# Patient Record
Sex: FEMALE | Race: WHITE | NOT HISPANIC OR LATINO | URBAN - METROPOLITAN AREA
[De-identification: names, ages, dates, MRNs, and addresses within clinical notes are randomized per-mention and may not be internally consistent; named-entity substitution may affect disease eponyms.]

---

## 2023-10-05 PROBLEM — F41.9 ANXIETY DISORDER: Status: ACTIVE | Noted: 2023-10-05

## 2023-10-05 PROBLEM — R63.5 WEIGHT GAIN: Status: ACTIVE | Noted: 2023-10-05

## 2023-10-05 PROBLEM — R10.13 EPIGASTRIC PAIN: Status: ACTIVE | Noted: 2023-10-05

## 2023-10-05 PROBLEM — D18.01 HEMANGIOMA OF SKIN AND SUBCUTANEOUS TISSUE: Status: ACTIVE | Noted: 2021-12-15

## 2023-10-05 PROBLEM — R53.83 FATIGUE: Status: ACTIVE | Noted: 2023-10-05

## 2023-10-05 PROBLEM — L81.4 OTHER MELANIN HYPERPIGMENTATION: Status: ACTIVE | Noted: 2021-12-15

## 2023-10-05 PROBLEM — K21.9 ACID REFLUX: Status: ACTIVE | Noted: 2023-10-05

## 2023-10-05 PROBLEM — N63.11 LUMP IN UPPER OUTER QUADRANT OF RIGHT BREAST: Status: ACTIVE | Noted: 2023-10-05

## 2023-10-05 PROBLEM — N89.8 VAGINAL DISCHARGE: Status: ACTIVE | Noted: 2023-10-05

## 2023-10-05 PROBLEM — L90.5 SCAR CONDITION AND FIBROSIS OF SKIN: Status: ACTIVE | Noted: 2021-12-15

## 2023-10-05 PROBLEM — N94.89 VAGINAL BURNING: Status: ACTIVE | Noted: 2023-10-05

## 2023-10-05 PROBLEM — R19.5 LOOSE STOOLS: Status: ACTIVE | Noted: 2023-10-05

## 2023-10-05 PROBLEM — D22.5 MELANOCYTIC NEVI OF TRUNK: Status: ACTIVE | Noted: 2021-12-15

## 2023-10-05 PROBLEM — N94.9 VAGINAL BURNING: Status: ACTIVE | Noted: 2023-10-05

## 2023-10-05 PROBLEM — N89.8 VAGINAL ITCHING: Status: ACTIVE | Noted: 2023-10-05

## 2023-10-05 PROBLEM — L57.8 OTHER SKIN CHANGES DUE TO CHRONIC EXPOSURE TO NONIONIZING RADIATION: Status: ACTIVE | Noted: 2021-12-15

## 2023-10-05 RX ORDER — ASPIRIN 325 MG
1 TABLET ORAL DAILY
COMMUNITY

## 2023-10-05 RX ORDER — OMEPRAZOLE 20 MG/1
1 TABLET, DELAYED RELEASE ORAL DAILY
COMMUNITY
End: 2023-10-09 | Stop reason: ALTCHOICE

## 2023-10-09 ENCOUNTER — OFFICE VISIT (OUTPATIENT)
Dept: PRIMARY CARE | Facility: CLINIC | Age: 38
End: 2023-10-09
Payer: COMMERCIAL

## 2023-10-09 VITALS
HEIGHT: 63 IN | TEMPERATURE: 97.9 F | BODY MASS INDEX: 22.86 KG/M2 | SYSTOLIC BLOOD PRESSURE: 117 MMHG | HEART RATE: 62 BPM | WEIGHT: 129 LBS | DIASTOLIC BLOOD PRESSURE: 82 MMHG

## 2023-10-09 DIAGNOSIS — Z00.00 HEALTH MAINTENANCE EXAMINATION: Primary | ICD-10-CM

## 2023-10-09 DIAGNOSIS — R53.83 FATIGUE, UNSPECIFIED TYPE: ICD-10-CM

## 2023-10-09 PROCEDURE — 99395 PREV VISIT EST AGE 18-39: CPT | Performed by: FAMILY MEDICINE

## 2023-10-09 PROCEDURE — 1036F TOBACCO NON-USER: CPT | Performed by: FAMILY MEDICINE

## 2023-10-09 PROCEDURE — 90686 IIV4 VACC NO PRSV 0.5 ML IM: CPT | Performed by: FAMILY MEDICINE

## 2023-10-09 PROCEDURE — 90471 IMMUNIZATION ADMIN: CPT | Performed by: FAMILY MEDICINE

## 2023-10-09 ASSESSMENT — PATIENT HEALTH QUESTIONNAIRE - PHQ9
1. LITTLE INTEREST OR PLEASURE IN DOING THINGS: NOT AT ALL
SUM OF ALL RESPONSES TO PHQ9 QUESTIONS 1 AND 2: 0
2. FEELING DOWN, DEPRESSED OR HOPELESS: NOT AT ALL

## 2023-10-09 NOTE — PROGRESS NOTES
"Subjective   Patient ID: Monika Madrigal is a 38 y.o. female who presents for Annual Exam.  Ends today for an annual wellness visit.  Reports that she is feeling well.  States has been eating healthy and exercising.  Reports mood has been good.  She denies any chest pain or shortness of breath or abdominal pain.  Reports periods are regular.  She denies any other concerns.  HPI  Review of Systems    Review of Systems negative except as noted in HPI and Chief complaint.     Objective               /82 (BP Location: Right arm, Patient Position: Sitting)   Pulse 62   Temp 36.6 °C (97.9 °F)   Ht 1.6 m (5' 3\")   Wt 58.5 kg (129 lb)   BMI 22.85 kg/m²    Physical Exam  Vitals reviewed.   HENT:      Head: Normocephalic and atraumatic.      Right Ear: Tympanic membrane normal.      Left Ear: Tympanic membrane normal.      Nose: Nose normal.   Eyes:      Extraocular Movements: Extraocular movements intact.      Conjunctiva/sclera: Conjunctivae normal.      Pupils: Pupils are equal, round, and reactive to light.   Cardiovascular:      Rate and Rhythm: Normal rate and regular rhythm.      Pulses: Normal pulses.   Pulmonary:      Effort: Pulmonary effort is normal.      Breath sounds: Normal breath sounds.   Chest:   Breasts:     Right: Normal. No mass, nipple discharge, skin change or tenderness.      Left: Normal. No mass, nipple discharge, skin change or tenderness.   Abdominal:      General: There is no distension.      Palpations: Abdomen is soft.      Tenderness: There is no abdominal tenderness.   Genitourinary:     General: Normal vulva.      Vagina: Normal.      Cervix: Normal.      Uterus: Normal.       Adnexa: Right adnexa normal and left adnexa normal.   Neurological:      General: No focal deficit present.      Mental Status: She is alert.             Assessment/Plan   Problem List Items Addressed This Visit       Fatigue    Relevant Orders    CBC and Auto Differential    Comprehensive Metabolic " Panel    Tsh With Reflex To Free T4 If Abnormal    Health maintenance examination - Primary    Relevant Orders    Lipid Panel

## 2023-10-11 ENCOUNTER — LAB (OUTPATIENT)
Dept: LAB | Facility: LAB | Age: 38
End: 2023-10-11
Payer: COMMERCIAL

## 2023-10-11 DIAGNOSIS — Z00.00 HEALTH MAINTENANCE EXAMINATION: ICD-10-CM

## 2023-10-11 DIAGNOSIS — R53.83 FATIGUE, UNSPECIFIED TYPE: ICD-10-CM

## 2023-10-11 LAB
ALBUMIN SERPL BCP-MCNC: 4.4 G/DL (ref 3.4–5)
ALP SERPL-CCNC: 30 U/L (ref 33–110)
ALT SERPL W P-5'-P-CCNC: 16 U/L (ref 7–45)
ANION GAP SERPL CALC-SCNC: 14 MMOL/L (ref 10–20)
AST SERPL W P-5'-P-CCNC: 20 U/L (ref 9–39)
BASOPHILS # BLD AUTO: 0.06 X10*3/UL (ref 0–0.1)
BASOPHILS NFR BLD AUTO: 1.1 %
BILIRUB SERPL-MCNC: 0.9 MG/DL (ref 0–1.2)
BUN SERPL-MCNC: 10 MG/DL (ref 6–23)
CALCIUM SERPL-MCNC: 9.7 MG/DL (ref 8.6–10.3)
CHLORIDE SERPL-SCNC: 103 MMOL/L (ref 98–107)
CHOLEST SERPL-MCNC: 200 MG/DL (ref 0–199)
CHOLESTEROL/HDL RATIO: 2.1
CO2 SERPL-SCNC: 26 MMOL/L (ref 21–32)
CREAT SERPL-MCNC: 0.8 MG/DL (ref 0.5–1.05)
EOSINOPHIL # BLD AUTO: 0.2 X10*3/UL (ref 0–0.7)
EOSINOPHIL NFR BLD AUTO: 3.8 %
ERYTHROCYTE [DISTWIDTH] IN BLOOD BY AUTOMATED COUNT: 11.8 % (ref 11.5–14.5)
GFR SERPL CREATININE-BSD FRML MDRD: >90 ML/MIN/1.73M*2
GLUCOSE SERPL-MCNC: 85 MG/DL (ref 74–99)
HCT VFR BLD AUTO: 41 % (ref 36–46)
HDLC SERPL-MCNC: 96.5 MG/DL
HGB BLD-MCNC: 13.5 G/DL (ref 12–16)
IMM GRANULOCYTES # BLD AUTO: 0.01 X10*3/UL (ref 0–0.7)
IMM GRANULOCYTES NFR BLD AUTO: 0.2 % (ref 0–0.9)
LDLC SERPL CALC-MCNC: 92 MG/DL (ref 130–180)
LYMPHOCYTES # BLD AUTO: 1.63 X10*3/UL (ref 1.2–4.8)
LYMPHOCYTES NFR BLD AUTO: 31.2 %
MCH RBC QN AUTO: 31.8 PG (ref 26–34)
MCHC RBC AUTO-ENTMCNC: 32.9 G/DL (ref 32–36)
MCV RBC AUTO: 97 FL (ref 80–100)
MONOCYTES # BLD AUTO: 0.68 X10*3/UL (ref 0.1–1)
MONOCYTES NFR BLD AUTO: 13 %
NEUTROPHILS # BLD AUTO: 2.65 X10*3/UL (ref 1.2–7.7)
NEUTROPHILS NFR BLD AUTO: 50.7 %
NON HDL CHOLESTEROL: 104 MG/DL (ref 0–149)
NRBC BLD-RTO: 0 /100 WBCS (ref 0–0)
PLATELET # BLD AUTO: 210 X10*3/UL (ref 150–450)
PMV BLD AUTO: 12.4 FL (ref 7.5–11.5)
POTASSIUM SERPL-SCNC: 4 MMOL/L (ref 3.5–5.3)
PROT SERPL-MCNC: 7.3 G/DL (ref 6.4–8.2)
RBC # BLD AUTO: 4.25 X10*6/UL (ref 4–5.2)
SODIUM SERPL-SCNC: 139 MMOL/L (ref 136–145)
TRIGL SERPL-MCNC: 56 MG/DL (ref 0–149)
TSH SERPL-ACNC: 3.13 MIU/L (ref 0.44–3.98)
VLDL: 11 MG/DL (ref 0–40)
WBC # BLD AUTO: 5.2 X10*3/UL (ref 4.4–11.3)

## 2023-10-11 PROCEDURE — 36415 COLL VENOUS BLD VENIPUNCTURE: CPT

## 2023-10-11 PROCEDURE — 80053 COMPREHEN METABOLIC PANEL: CPT

## 2023-10-11 PROCEDURE — 84443 ASSAY THYROID STIM HORMONE: CPT

## 2023-10-11 PROCEDURE — 80061 LIPID PANEL: CPT

## 2023-10-11 PROCEDURE — 85025 COMPLETE CBC W/AUTO DIFF WBC: CPT

## 2024-09-13 PROBLEM — J45.990 EXERCISE-INDUCED ASTHMA (HHS-HCC): Status: ACTIVE | Noted: 2019-06-22

## 2024-09-19 ENCOUNTER — APPOINTMENT (OUTPATIENT)
Dept: PRIMARY CARE | Facility: CLINIC | Age: 39
End: 2024-09-19
Payer: COMMERCIAL

## 2024-09-19 VITALS
DIASTOLIC BLOOD PRESSURE: 63 MMHG | HEIGHT: 63 IN | BODY MASS INDEX: 22.86 KG/M2 | WEIGHT: 129 LBS | HEART RATE: 75 BPM | SYSTOLIC BLOOD PRESSURE: 97 MMHG

## 2024-09-19 DIAGNOSIS — Z12.4 PAP SMEAR FOR CERVICAL CANCER SCREENING: ICD-10-CM

## 2024-09-19 DIAGNOSIS — R53.83 FATIGUE, UNSPECIFIED TYPE: ICD-10-CM

## 2024-09-19 DIAGNOSIS — J30.9 ALLERGIC RHINITIS, UNSPECIFIED SEASONALITY, UNSPECIFIED TRIGGER: ICD-10-CM

## 2024-09-19 DIAGNOSIS — J45.990 EXERCISE-INDUCED ASTHMA (HHS-HCC): ICD-10-CM

## 2024-09-19 DIAGNOSIS — Z00.00 HEALTHCARE MAINTENANCE: Primary | ICD-10-CM

## 2024-09-19 PROBLEM — N63.11 LUMP IN UPPER OUTER QUADRANT OF RIGHT BREAST: Status: RESOLVED | Noted: 2023-10-05 | Resolved: 2024-09-19

## 2024-09-19 PROBLEM — N94.9 VAGINAL BURNING: Status: RESOLVED | Noted: 2023-10-05 | Resolved: 2024-09-19

## 2024-09-19 PROBLEM — N94.89 VAGINAL BURNING: Status: RESOLVED | Noted: 2023-10-05 | Resolved: 2024-09-19

## 2024-09-19 PROBLEM — N89.8 VAGINAL DISCHARGE: Status: RESOLVED | Noted: 2023-10-05 | Resolved: 2024-09-19

## 2024-09-19 PROBLEM — R19.5 LOOSE STOOLS: Status: RESOLVED | Noted: 2023-10-05 | Resolved: 2024-09-19

## 2024-09-19 PROBLEM — N89.8 VAGINAL ITCHING: Status: RESOLVED | Noted: 2023-10-05 | Resolved: 2024-09-19

## 2024-09-19 PROCEDURE — 3008F BODY MASS INDEX DOCD: CPT | Performed by: FAMILY MEDICINE

## 2024-09-19 PROCEDURE — 1036F TOBACCO NON-USER: CPT | Performed by: FAMILY MEDICINE

## 2024-09-19 PROCEDURE — 88175 CYTOPATH C/V AUTO FLUID REDO: CPT

## 2024-09-19 PROCEDURE — 90471 IMMUNIZATION ADMIN: CPT | Performed by: FAMILY MEDICINE

## 2024-09-19 PROCEDURE — 90656 IIV3 VACC NO PRSV 0.5 ML IM: CPT | Performed by: FAMILY MEDICINE

## 2024-09-19 PROCEDURE — 99395 PREV VISIT EST AGE 18-39: CPT | Performed by: FAMILY MEDICINE

## 2024-09-19 NOTE — PROGRESS NOTES
Subjective   Patient ID: Monika Madrigal is a 39 y.o. female who presents for Annual Exam (Pap exam as well.).  Presents today for a physical.  She reports that overall she has been doing well.  She has been eating healthy and exercising.  Reports mood has been good.  She denies any chest pain or shortness of breath or abdominal pain.  Reports that she had a history of asthma as a teenager that was exercise-induced but has not had any recent issues.  She has had some issues with allergies.  They have a new dog and she is concerned that she may be allergic to the dog.  She like to have allergy testing done.  She does take Claritin.  She reports periods are regular.  She denies any other concerns.  HPI  Social History     Socioeconomic History    Marital status:      Spouse name: Not on file    Number of children: Not on file    Years of education: Not on file    Highest education level: Not on file   Occupational History    Not on file   Tobacco Use    Smoking status: Never    Smokeless tobacco: Never   Substance and Sexual Activity    Alcohol use: Yes     Comment: social    Drug use: Never    Sexual activity: Not on file   Other Topics Concern    Not on file   Social History Narrative    Not on file     Social Determinants of Health     Financial Resource Strain: Not on file   Food Insecurity: Not on file   Transportation Needs: Not on file   Physical Activity: Not on file   Stress: Not on file   Social Connections: Not on file   Intimate Partner Violence: Not on file   Housing Stability: Not on file     Current Outpatient Medications   Medication Sig Dispense Refill    multivitamin (One Daily Multivitamin) tablet Take 1 tablet by mouth once daily.       No current facility-administered medications for this visit.     Family History   Problem Relation Name Age of Onset    Heart disease Mother      Hyperlipidemia Mother      Hypertension Mother      Hypothyroidism Mother      Breast cancer Other G/M      "Other (CANCER OF GASTROINTESTIONAL TRACT) Other G/M     Colon cancer Other G/F     Other (CANCER OF GASROINTESTIONAL TRACT) Other G/F      Review of Systems  Immunization History   Administered Date(s) Administered    Flu vaccine (IIV4), preservative free *Check age/dose* 10/06/2020, 10/09/2023    Flu vaccine, trivalent, preservative free, age 6 months and greater (Fluarix/Fluzone/Flulaval) 09/19/2024    Influenza, Unspecified 11/02/2021, 09/22/2022    Influenza, injectable, quadrivalent 11/13/2018    Pfizer Purple Cap SARS-CoV-2 03/10/2021, 03/31/2021, 12/08/2021    Rho(D)-IG 04/02/2019, 06/22/2019    Tdap vaccine, age 7 year and older (BOOSTRIX, ADACEL) 05/14/2019       Review of Systems negative except as noted in HPI and Chief complaint.     Objective                 BP 97/63 (BP Location: Left arm, Patient Position: Sitting)   Pulse 75   Ht 1.6 m (5' 3\")   Wt 58.5 kg (129 lb)   BMI 22.85 kg/m²    Physical Exam  Vitals reviewed.   HENT:      Head: Normocephalic and atraumatic.      Right Ear: Tympanic membrane normal.      Left Ear: Tympanic membrane normal.      Nose: Nose normal.      Mouth/Throat:      Pharynx: Oropharynx is clear.   Eyes:      Extraocular Movements: Extraocular movements intact.      Conjunctiva/sclera: Conjunctivae normal.      Pupils: Pupils are equal, round, and reactive to light.   Cardiovascular:      Rate and Rhythm: Normal rate and regular rhythm.      Pulses: Normal pulses.   Pulmonary:      Effort: Pulmonary effort is normal.      Breath sounds: Normal breath sounds.   Chest:   Breasts:     Right: Normal. No mass, nipple discharge, skin change or tenderness.      Left: Normal. No mass, nipple discharge, skin change or tenderness.   Abdominal:      General: There is no distension.      Palpations: Abdomen is soft.      Tenderness: There is no abdominal tenderness.   Genitourinary:     General: Normal vulva.      Vagina: Normal.      Cervix: Normal.      Uterus: Normal.       " "Adnexa: Right adnexa normal and left adnexa normal.   Neurological:      General: No focal deficit present.      Mental Status: She is alert.       No results found for this or any previous visit (from the past 96 hour(s)).  Lab Results   Component Value Date    WBC 5.2 10/11/2023    HGB 13.5 10/11/2023    HCT 41.0 10/11/2023    MCV 97 10/11/2023     10/11/2023     Lab Results   Component Value Date    GLUCOSE 85 10/11/2023    CALCIUM 9.7 10/11/2023     10/11/2023    K 4.0 10/11/2023    CO2 26 10/11/2023     10/11/2023    BUN 10 10/11/2023    CREATININE 0.80 10/11/2023     Lab Results   Component Value Date    CHOL 200 (H) 10/11/2023    CHOL 186 10/18/2022    CHOL 196 11/11/2021     Lab Results   Component Value Date    HDL 96.5 10/11/2023    HDL 96.0 10/18/2022    HDL 74.0 11/11/2021     Lab Results   Component Value Date    LDLCALC 92 (L) 10/11/2023     Lab Results   Component Value Date    TRIG 56 10/11/2023    TRIG 44 10/18/2022    TRIG 61 11/11/2021     No components found for: \"CHOLHDL\"   Lab Results   Component Value Date    TSH 3.13 10/11/2023      Lab Results   Component Value Date    HGBA1C 4.4 10/10/2019      No results found for: \"ALBUMINUR\"   Assessment/Plan   Problem List Items Addressed This Visit       Fatigue     Check blood work as ordered including thyroid studies due to family history.         Relevant Orders    Comprehensive Metabolic Panel    CBC and Auto Differential    TSH with reflex to Free T4 if abnormal    Healthcare maintenance - Primary     Continue with healthy diet and exercise.  Screening blood work ordered.  Pap smear done today.  Flu shot given today.  Recommend COVID booster.         Relevant Orders    Lipid Panel    Exercise-induced asthma (HHS-HCC)     Patient has not had symptoms in years.  Will continue to monitor.         Allergic rhinitis     Patient can continue with over-the-counter antihistamines and also add a steroid nasal spray.  Will also refer her " to allergist for possible testing         Relevant Orders    Referral to Allergy     Other Visit Diagnoses       Pap smear for cervical cancer screening        Relevant Orders    THINPREP PAP TEST

## 2024-09-19 NOTE — ASSESSMENT & PLAN NOTE
Patient can continue with over-the-counter antihistamines and also add a steroid nasal spray.  Will also refer her to allergist for possible testing

## 2024-09-19 NOTE — ASSESSMENT & PLAN NOTE
Continue with healthy diet and exercise.  Screening blood work ordered.  Pap smear done today.  Flu shot given today.  Recommend COVID booster.

## 2025-01-10 ENCOUNTER — LAB (OUTPATIENT)
Dept: LAB | Facility: LAB | Age: 40
End: 2025-01-10
Payer: COMMERCIAL

## 2025-01-10 DIAGNOSIS — Z00.00 HEALTHCARE MAINTENANCE: ICD-10-CM

## 2025-01-10 DIAGNOSIS — R53.83 FATIGUE, UNSPECIFIED TYPE: ICD-10-CM

## 2025-01-10 LAB
ALBUMIN SERPL BCP-MCNC: 4.6 G/DL (ref 3.4–5)
ALP SERPL-CCNC: 33 U/L (ref 33–110)
ALT SERPL W P-5'-P-CCNC: 21 U/L (ref 7–45)
ANION GAP SERPL CALC-SCNC: 10 MMOL/L (ref 10–20)
AST SERPL W P-5'-P-CCNC: 16 U/L (ref 9–39)
BASOPHILS # BLD AUTO: 0.05 X10*3/UL (ref 0–0.1)
BASOPHILS NFR BLD AUTO: 1 %
BILIRUB SERPL-MCNC: 0.8 MG/DL (ref 0–1.2)
BUN SERPL-MCNC: 8 MG/DL (ref 6–23)
CALCIUM SERPL-MCNC: 9.5 MG/DL (ref 8.6–10.3)
CHLORIDE SERPL-SCNC: 103 MMOL/L (ref 98–107)
CHOLEST SERPL-MCNC: 219 MG/DL (ref 0–199)
CHOLESTEROL/HDL RATIO: 2.4
CO2 SERPL-SCNC: 29 MMOL/L (ref 21–32)
CREAT SERPL-MCNC: 0.65 MG/DL (ref 0.5–1.05)
EGFRCR SERPLBLD CKD-EPI 2021: >90 ML/MIN/1.73M*2
EOSINOPHIL # BLD AUTO: 0.15 X10*3/UL (ref 0–0.7)
EOSINOPHIL NFR BLD AUTO: 3.1 %
ERYTHROCYTE [DISTWIDTH] IN BLOOD BY AUTOMATED COUNT: 11.8 % (ref 11.5–14.5)
GLUCOSE SERPL-MCNC: 89 MG/DL (ref 74–99)
HCT VFR BLD AUTO: 39.9 % (ref 36–46)
HDLC SERPL-MCNC: 89.6 MG/DL
HGB BLD-MCNC: 13.1 G/DL (ref 12–16)
IMM GRANULOCYTES # BLD AUTO: 0.02 X10*3/UL (ref 0–0.7)
IMM GRANULOCYTES NFR BLD AUTO: 0.4 % (ref 0–0.9)
LDLC SERPL CALC-MCNC: 122 MG/DL
LYMPHOCYTES # BLD AUTO: 1.32 X10*3/UL (ref 1.2–4.8)
LYMPHOCYTES NFR BLD AUTO: 27.5 %
MCH RBC QN AUTO: 31.1 PG (ref 26–34)
MCHC RBC AUTO-ENTMCNC: 32.8 G/DL (ref 32–36)
MCV RBC AUTO: 95 FL (ref 80–100)
MONOCYTES # BLD AUTO: 0.67 X10*3/UL (ref 0.1–1)
MONOCYTES NFR BLD AUTO: 14 %
NEUTROPHILS # BLD AUTO: 2.59 X10*3/UL (ref 1.2–7.7)
NEUTROPHILS NFR BLD AUTO: 54 %
NON HDL CHOLESTEROL: 129 MG/DL (ref 0–149)
NRBC BLD-RTO: 0 /100 WBCS (ref 0–0)
PLATELET # BLD AUTO: 197 X10*3/UL (ref 150–450)
POTASSIUM SERPL-SCNC: 4.2 MMOL/L (ref 3.5–5.3)
PROT SERPL-MCNC: 6.9 G/DL (ref 6.4–8.2)
RBC # BLD AUTO: 4.21 X10*6/UL (ref 4–5.2)
SODIUM SERPL-SCNC: 138 MMOL/L (ref 136–145)
TRIGL SERPL-MCNC: 39 MG/DL (ref 0–149)
TSH SERPL-ACNC: 1.64 MIU/L (ref 0.44–3.98)
VLDL: 8 MG/DL (ref 0–40)
WBC # BLD AUTO: 4.8 X10*3/UL (ref 4.4–11.3)

## 2025-01-10 PROCEDURE — 80053 COMPREHEN METABOLIC PANEL: CPT

## 2025-01-10 PROCEDURE — 80061 LIPID PANEL: CPT

## 2025-01-10 PROCEDURE — 84443 ASSAY THYROID STIM HORMONE: CPT

## 2025-01-10 PROCEDURE — 85025 COMPLETE CBC W/AUTO DIFF WBC: CPT

## 2025-03-06 ASSESSMENT — DERMATOLOGY QUALITY OF LIFE (QOL) ASSESSMENT
WHAT SINGLE SKIN CONDITION LISTED BELOW IS THE PATIENT ANSWERING THE QUALITY-OF-LIFE ASSESSMENT QUESTIONS ABOUT: ACNE
WHAT SINGLE SKIN CONDITION LISTED BELOW IS THE PATIENT ANSWERING THE QUALITY-OF-LIFE ASSESSMENT QUESTIONS ABOUT: ACNE
DATE THE QUALITY-OF-LIFE ASSESSMENT WAS COMPLETED: 67270
RATE HOW EMOTIONALLY BOTHERED YOU ARE BY YOUR SKIN PROBLEM (FOR EXAMPLE, WORRY, EMBARRASSMENT, FRUSTRATION): 3
RATE HOW BOTHERED YOU ARE BY SYMPTOMS OF YOUR SKIN PROBLEM (EG, ITCHING, STINGING BURNING, HURTING OR SKIN IRRITATION): 1
RATE HOW BOTHERED YOU ARE BY SYMPTOMS OF YOUR SKIN PROBLEM (EG, ITCHING, STINGING BURNING, HURTING OR SKIN IRRITATION): 1
RATE HOW BOTHERED YOU ARE BY EFFECTS OF YOUR SKIN PROBLEMS ON YOUR ACTIVITIES (EG, GOING OUT, ACCOMPLISHING WHAT YOU WANT, WORK ACTIVITIES OR YOUR RELATIONSHIPS WITH OTHERS): 1
RATE HOW BOTHERED YOU ARE BY EFFECTS OF YOUR SKIN PROBLEMS ON YOUR ACTIVITIES (EG, GOING OUT, ACCOMPLISHING WHAT YOU WANT, WORK ACTIVITIES OR YOUR RELATIONSHIPS WITH OTHERS): 1
RATE HOW EMOTIONALLY BOTHERED YOU ARE BY YOUR SKIN PROBLEM (FOR EXAMPLE, WORRY, EMBARRASSMENT, FRUSTRATION): 3

## 2025-03-06 ASSESSMENT — PATIENT GLOBAL ASSESSMENT (PGA): WHAT IS THE PGA: PATIENT GLOBAL ASSESSMENT:  2 - MILD

## 2025-03-07 ENCOUNTER — OFFICE VISIT (OUTPATIENT)
Dept: DERMATOLOGY | Facility: CLINIC | Age: 40
End: 2025-03-07
Payer: COMMERCIAL

## 2025-03-07 DIAGNOSIS — D22.71 MELANOCYTIC NEVI OF RIGHT LOWER LIMB, INCLUDING HIP: ICD-10-CM

## 2025-03-07 DIAGNOSIS — L71.0 PERIORAL DERMATITIS: Primary | ICD-10-CM

## 2025-03-07 DIAGNOSIS — L81.4 LENTIGO: ICD-10-CM

## 2025-03-07 DIAGNOSIS — Z12.83 ENCOUNTER FOR SCREENING FOR MALIGNANT NEOPLASM OF SKIN: ICD-10-CM

## 2025-03-07 DIAGNOSIS — D22.60 MELANOCYTIC NEVI OF UNSPECIFIED UPPER LIMB, INCLUDING SHOULDER: ICD-10-CM

## 2025-03-07 DIAGNOSIS — L57.8 PHOTOAGING OF SKIN: ICD-10-CM

## 2025-03-07 DIAGNOSIS — D18.01 HEMANGIOMA OF SKIN: ICD-10-CM

## 2025-03-07 DIAGNOSIS — D22.72 MELANOCYTIC NEVUS OF LEFT LOWER EXTREMITY: ICD-10-CM

## 2025-03-07 DIAGNOSIS — D22.5 MELANOCYTIC NEVI OF TRUNK: ICD-10-CM

## 2025-03-07 PROCEDURE — 99204 OFFICE O/P NEW MOD 45 MIN: CPT | Performed by: DERMATOLOGY

## 2025-03-07 PROCEDURE — 1036F TOBACCO NON-USER: CPT | Performed by: DERMATOLOGY

## 2025-03-07 RX ORDER — METRONIDAZOLE 7.5 MG/G
CREAM TOPICAL
Qty: 45 G | Refills: 3 | Status: SHIPPED | OUTPATIENT
Start: 2025-03-07

## 2025-03-07 NOTE — PROGRESS NOTES
Subjective     Johanna Madrigal is a 39 y.o. female who presents for the following: Skin Check (Pt here today for FBSE. No hx or fhx of skin cancer. ) and Rash (Around mouth - breakouts. Seems different than normal acne. Has tried some OTC acne treatments. She did start a redlight treatment on her face and unsure if this has worsened or provoked it. She denies use of intranasal corticosteroid sprays or recent dental procedure).     Review of Systems:  No other skin or systemic complaints other than what is documented elsewhere in the note.    The following portions of the chart were reviewed this encounter and updated as appropriate:         Skin Cancer History  No skin cancer on file.      Specialty Problems          Dermatology Problems    Hemangioma of skin and subcutaneous tissue    Melanocytic nevi of trunk    Other melanin hyperpigmentation    Other skin changes due to chronic exposure to nonionizing radiation    Scar condition and fibrosis of skin        Objective   Well appearing patient in no apparent distress; mood and affect are within normal limits.    A full examination was performed including scalp, head, eyes, ears, nose, lips, neck, chest, axillae, abdomen, back, buttocks, bilateral upper extremities, bilateral lower extremities, hands, feet, fingers, toes, fingernails, and toenails. Declined examination of genitals, does see provider for routine pelvic examinations.  All findings within normal limits unless otherwise noted below.    Assessment/Plan   1. Perioral dermatitis  Right Lower Cutaneous Lip, Right Upper Cutaneous Lip  Perioral and perinasal inflammatory papules and pustules    The chronic and intermittently flaring nature of the skin condition was discussed with the patient today. Discussed common triggers associated with perioral dermatitis including fluoride treatment, cinnamon gum and/or mints, inhaled or oral corticosteroids. Various treatment options discussed and risks and benefits  of each.   Patient to begin Metronidazole 0.75% cream 2x daily to affected area, can continue to use to prevent recurrences. If not improving after 4-6 weeks advised to contact office and consider oral antibiotic therapy, specifically minocycline.      Related Medications  metroNIDAZOLE (Metrocream) 0.75 % cream  Apply to affected area on the face 2x daily    2. Photoaging of skin  Mottled pigmentation with telangiectasias and brown reticular macules in sun exposed areas of the body.    The risk of chronic, cumulative sun damage and risk of development of skin cancer was reviewed today.   The importance of sun protection was reviewed: including the use of a broad spectrum sunscreen of at least SPF 30 that protects against both UVA/UVB rays, with ingredients such as Zinc oxide or titanium dioxide, wearing sun protective clothing and sun avoidance. We reviewed the warning signs of non-melanoma skin cancer and ABCDEs of melanoma  Please follow up should you notice any new or changing pre-existing skin lesion.    3. Hemangioma of skin  Cherry red papules    The benign nature of these skin lesions were reviewed, no treatment is necessary.   Please follow up for any new or pre-existing lesion that is changing in size, shape, color, becomes painful, tender, itches or bleed.    4. Lentigo  Scattered tan macules in sun-exposed areas.    These are benign skin lesions due to sun exposure. They will darken in response to sun exposure. They should be monitored for change in size, shape or color.  These lesions can be treated cosmetically with topical creams, liquid nitrogen and a variety of lasers.    5. Melanocytic nevi of trunk (3)  Abdomen (Lower Torso, Anterior), Chest (Upper Torso, Anterior), Torso - Posterior (Back)  Tan-brown symmetric macules and papules    Clinically benign appearing nevi, no treatment is necessary.  The importance of sun protection was reviewed: including the use of a broad spectrum sunscreen that  protects against both UVA/UVB rays, with ingredients such as Zinc oxide or titanium dioxide, wearing sun protective clothing and sun avoidance.   ABCDEs of melanoma reviewed.  Please follow up should you notice any new or changing pre-existing skin lesion.    6. Melanocytic nevi of unspecified upper limb, including shoulder (2)  Left Arm, Right Arm  Scattered, uniform and benign-appearing, regular brown melanocytic papules and macules.    Clinically benign appearing nevi, no treatment is necessary.  The importance of sun protection was reviewed: including the use of a broad spectrum sunscreen that protects against both UVA/UVB rays, with ingredients such as Zinc oxide or titanium dioxide, wearing sun protective clothing and sun avoidance.   ABCDEs of melanoma reviewed.  Please follow up should you notice any new or changing pre-existing skin lesion.    7. Melanocytic nevus of left lower extremity  Left Leg  Scattered, uniform and benign-appearing, regular brown melanocytic papules and macules.    Clinically benign appearing nevi, no treatment is necessary.  The importance of sun protection was reviewed: including the use of a broad spectrum sunscreen that protects against both UVA/UVB rays, with ingredients such as Zinc oxide or titanium dioxide, wearing sun protective clothing and sun avoidance.   ABCDEs of melanoma reviewed.  Please follow up should you notice any new or changing pre-existing skin lesion.    8. Melanocytic nevi of right lower limb, including hip  Right Leg  Scattered, uniform and benign-appearing, regular brown melanocytic papules and macules.    Clinically benign appearing nevi, no treatment is necessary.  The importance of sun protection was reviewed: including the use of a broad spectrum sunscreen that protects against both UVA/UVB rays, with ingredients such as Zinc oxide or titanium dioxide, wearing sun protective clothing and sun avoidance.   ABCDEs of melanoma reviewed.  Please follow up  should you notice any new or changing pre-existing skin lesion.    9. Encounter for screening for malignant neoplasm of skin  No suspicious lesions noted on examination today    The risk of chronic, cumulative sun damage and risk of development of skin cancer was reviewed today.   The importance of sun protection was reviewed: including the use of a broad spectrum sunscreen of at least SPF 30 that protects against both UVA/UVB rays, with ingredients such as Zinc oxide or titanium dioxide, wearing sun protective clothing and sun avoidance. We reviewed the warning signs of non-melanoma skin cancer and ABCDEs of melanoma  Please follow up should you notice any new or changing pre-existing skin lesion.        Follow up as needed.

## 2025-04-29 DIAGNOSIS — L71.0 PERIORAL DERMATITIS: Primary | ICD-10-CM

## 2025-04-29 RX ORDER — MINOCYCLINE HYDROCHLORIDE 100 MG/1
100 CAPSULE ORAL 2 TIMES DAILY
Qty: 60 CAPSULE | Refills: 1 | Status: SHIPPED | OUTPATIENT
Start: 2025-04-29 | End: 2025-07-28